# Patient Record
Sex: MALE | NOT HISPANIC OR LATINO | URBAN - NONMETROPOLITAN AREA
[De-identification: names, ages, dates, MRNs, and addresses within clinical notes are randomized per-mention and may not be internally consistent; named-entity substitution may affect disease eponyms.]

---

## 2019-02-04 NOTE — PATIENT DISCUSSION
RETINAL CHANGES, PT PREVIOUSLY WORKED UP BY RETINAL SPECIALIST AND NO INTERVENTION NEEDED. CONTINUE TO MONITOR.

## 2019-04-18 ENCOUNTER — IMPORTED ENCOUNTER (OUTPATIENT)
Dept: URBAN - NONMETROPOLITAN AREA CLINIC 1 | Facility: CLINIC | Age: 54
End: 2019-04-18

## 2019-04-18 PROBLEM — H52.4: Noted: 2019-04-18

## 2019-04-18 PROCEDURE — S0620 ROUTINE OPHTHALMOLOGICAL EXA: HCPCS

## 2020-03-09 NOTE — PATIENT DISCUSSION
DRY EYES : Discussed with patient the importance of keeping the eye moist and the symptoms associated with dry eyes including blurry vision, tearing, burning, and ysabel sensation. Advised patient to minimize use of any fans blowing directly on the face. Advised patient to continue with artificial tears 2-3 times daily.

## 2021-01-22 NOTE — PATIENT DISCUSSION
TERRELL/K SICCA, OD&gt;OS - INC ARTIFICIAL TEARS TO QID OU (RECOMMEND GEL DROP) AND START THE DAILY INTAKE OF OMEGA 3/FATTY ACIDS. CONSIDER OTHER TREATMENT OPTIONS IF SYMPTOMS PERSIST/WORSEN. FOLLOW.

## 2021-01-22 NOTE — PATIENT DISCUSSION
CATARACTS, OU -  VISUALLY SIGNIFICANT. PATIENT ED EYESTRAIN OD COULD BE CONTRIBUTING TO DISCOMFORT.  DEFER NEW GLS RX, PATIENT CONSIDERING CATARACT SX. SCHEDULE CAT EVAL WITH DR ABREU NEXT AVAILABLE

## 2021-05-07 NOTE — PATIENT DISCUSSION
1 DAY CAT POST-OP, OD - Doing well, PATIENT REASSURANCE GIVEN, CALL IF NAUSEA/PAIN WORSENS. Continue Ocuflox QID, Acular QID and Pred Forte QID until next visit. Patient was also given drop instruction sheet.

## 2021-06-22 NOTE — PATIENT DISCUSSION
Stopped Today: Pred Forte (prednisolone acetate): drops,suspension: 1% 1 drop four times a day as directed into affected eye 04-

## 2021-06-22 NOTE — PATIENT DISCUSSION
Stopped Today: ketorolac (ketorolac): drops: 0.5% 1 drop four times a day as directed into affected eye 04-

## 2021-09-24 NOTE — PATIENT DISCUSSION
3 MONTH CAT POST OP, OD -  DOING WELL. NO DIABETIC RETINOPATHY OR DME OU. DISC IMPORTANCE OF GOOD BLOOD SUGAR CONTROL. MONITOR WITH ANNUAL DILATED EXAMS.

## 2021-11-22 NOTE — PATIENT DISCUSSION
Cosmetic: Ptosis is not visually significant enough for insurance to cover surgery. The patient understands ptosis surgery will be performed for cosmetic reasons and understands payment is an out-of-pocket expense. Discussed the risks, benefits, inflammation, infection, and bleeding. The patient elects to proceed with ptosis surgery.

## 2021-11-22 NOTE — PATIENT DISCUSSION
The risks of the procedure include but are not limited to pain, bleeding, infection, further surgery, scarring, injury to eye, dry eye syndrome, and lid asymmetry. Significant swelling and bruising after surgery should be expected. The patient was instructed to use ice and heat following the procedure, as well as artificial tears to reduce these symptoms. Margin distance 1 for the eye lids. The patient understands and elects to proceed with blepharoplasty. An Rx of Erythromycin ointment was given to be used as directed.

## 2022-02-24 NOTE — PATIENT DISCUSSION
instructed patient to discontinue store brand artificial tears . Gave sample of Refresh PF and instructed her to get Thera Tears gel.

## 2022-03-29 NOTE — PATIENT DISCUSSION
Patient to continue artificial tears several times a day. Healing nicely and lids are closing properly. She does have rosacea so maxitrol ointment prescribed to use at bedtime until tube runs out. If still bothersome call the office to be seen.

## 2022-04-09 ASSESSMENT — VISUAL ACUITY
OD_CC: 20/60-
OS_CC: 20/25

## 2022-04-09 ASSESSMENT — TONOMETRY
OD_IOP_MMHG: 16
OS_IOP_MMHG: 16